# Patient Record
Sex: FEMALE | Race: WHITE | Employment: UNEMPLOYED | URBAN - NONMETROPOLITAN AREA
[De-identification: names, ages, dates, MRNs, and addresses within clinical notes are randomized per-mention and may not be internally consistent; named-entity substitution may affect disease eponyms.]

---
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
## 2022-01-04 NOTE — PROGRESS NOTES
Gynecologic History  Menarche at age 16    She delivered her first child at age 27, and did not breastfeed, used pump  Premenopausal  No hysterectomy  Oral contraceptive use: yes for 1 years and is not currently taking  Hormone use: no and is not currently taking    Bra Size: 34 B      Review of Systems   Constitutional: Positive for unexpected weight change (Trouble gaing weight). Eyes: Negative for visual disturbance. Respiratory: Negative for cough and shortness of breath. Cardiovascular: Negative for chest pain. Gastrointestinal: Positive for abdominal pain (Lower abd cramping). Musculoskeletal: Negative for arthralgias and myalgias. Neurological: Negative for headaches. Hematological: Negative for adenopathy. Does not bruise/bleed easily. Psychiatric/Behavioral: Positive for dysphoric mood (HX of depression). The patient is nervous/anxious.

## 2022-01-06 ENCOUNTER — OFFICE VISIT (OUTPATIENT)
Dept: SURGERY | Age: 22
End: 2022-01-06
Payer: COMMERCIAL

## 2022-01-06 VITALS
DIASTOLIC BLOOD PRESSURE: 63 MMHG | HEART RATE: 89 BPM | BODY MASS INDEX: 19.46 KG/M2 | SYSTOLIC BLOOD PRESSURE: 120 MMHG | TEMPERATURE: 98.7 F | RESPIRATION RATE: 16 BRPM | OXYGEN SATURATION: 98 % | HEIGHT: 64 IN | WEIGHT: 114 LBS

## 2022-01-06 DIAGNOSIS — N63.0 BREAST MASS: Primary | ICD-10-CM

## 2022-01-06 PROCEDURE — G8420 CALC BMI NORM PARAMETERS: HCPCS | Performed by: SURGERY

## 2022-01-06 PROCEDURE — 4004F PT TOBACCO SCREEN RCVD TLK: CPT | Performed by: SURGERY

## 2022-01-06 PROCEDURE — G8484 FLU IMMUNIZE NO ADMIN: HCPCS | Performed by: SURGERY

## 2022-01-06 PROCEDURE — G8427 DOCREV CUR MEDS BY ELIG CLIN: HCPCS | Performed by: SURGERY

## 2022-01-06 PROCEDURE — 99204 OFFICE O/P NEW MOD 45 MIN: CPT | Performed by: SURGERY

## 2022-01-06 ASSESSMENT — ENCOUNTER SYMPTOMS
ABDOMINAL PAIN: 1
COUGH: 0
SHORTNESS OF BREATH: 0

## 2022-01-06 NOTE — PROGRESS NOTES
22    CHIEF COMPLAINT: left breast mass    HISTORY OF PRESENT ILLNESS:  Jay Rudolph is a 23 y.o. woman who requested that I evaluate her for her left breast mass. The patient states that she palpated a mass in her left breast about 1 year ago. She underwent a left breast ultrasound in 2021 which demonstrated a probably benign mass for which she was recommended to undergo short interval follow-up imaging. She had this follow-up imaging last week which demonstrated a slight increase in size of the mass. She thinks it feels like the mass has increased slightly in size based on her own examination. She does experience occasional short shooting pains a few times a week. She presents today for a second opinion regarding this. The patient states that she herself had not noticed any other abnormal masses in either breast.  She denies any change in the appearance of her breasts or the skin of her breasts. She denies bilateral nipple discharge. She has no other systemic complaints and otherwise feels well today. She does have bilateral nipple piercings and states that she has been thinking of taking them out as the edges are not healing well. Pertinent Family History: No family history of breast or ovarian cancer. GYNECOLOGIC HISTORY:  Menarche at age 16    She delivered her first child at age 27, and did not breastfeed (used pump for 1 month)  Premenopausal  No hysterectomy  Oral contraceptive use: yes for 1 years and is not currently taking  Hormone use: no and is not currently taking    Past Medical History:   Diagnosis Date    Anemia      Past Surgical History:   Procedure Laterality Date    APPENDECTOMY       No current outpatient medications on file. No current facility-administered medications for this visit.      No Known Allergies  Social History     Socioeconomic History    Marital status: Single     Spouse name: Not on file    Number of children: Not on file    Years of education: Not on file    Highest education level: Not on file   Occupational History    Not on file   Tobacco Use    Smoking status: Current Every Day Smoker     Packs/day: 0.50     Years: 1.00     Pack years: 0.50     Types: Cigarettes    Smokeless tobacco: Never Used   Vaping Use    Vaping Use: Never used   Substance and Sexual Activity    Alcohol use: No    Drug use: No    Sexual activity: Yes     Partners: Male   Other Topics Concern    Not on file   Social History Narrative    Not on file     Social Determinants of Health     Financial Resource Strain:     Difficulty of Paying Living Expenses: Not on file   Food Insecurity:     Worried About Running Out of Food in the Last Year: Not on file    Golden of Food in the Last Year: Not on file   Transportation Needs:     Lack of Transportation (Medical): Not on file    Lack of Transportation (Non-Medical): Not on file   Physical Activity:     Days of Exercise per Week: Not on file    Minutes of Exercise per Session: Not on file   Stress:     Feeling of Stress : Not on file   Social Connections:     Frequency of Communication with Friends and Family: Not on file    Frequency of Social Gatherings with Friends and Family: Not on file    Attends Gnosticism Services: Not on file    Active Member of [address] [place] [address] [employer] or Organizations: Not on file    Attends Club or Organization Meetings: Not on file    Marital Status: Not on file   Intimate Partner Violence:     Fear of Current or Ex-Partner: Not on file    Emotionally Abused: Not on file    Physically Abused: Not on file    Sexually Abused: Not on file   Housing Stability:     Unable to Pay for Housing in the Last Year: Not on file    Number of [place] in the Last Year: Not on file    Unstable Housing in the Last Year: Not on file     History reviewed. No pertinent family history. REVIEW OF SYSTEMS:   Constitutional: Positive for unexpected weight change (Trouble gaing weight).    Eyes: Negative for visual disturbance. Respiratory: Negative for cough and shortness of breath. Cardiovascular: Negative for chest pain. Gastrointestinal: Positive for abdominal pain (Lower abd cramping). Musculoskeletal: Negative for arthralgias and myalgias. Neurological: Negative for headaches. Hematological: Negative for adenopathy. Does not bruise/bleed easily. Psychiatric/Behavioral: Positive for dysphoric mood (HX of depression). The patient is nervous/anxious. I personally reviewed and agree with the above ROS as documented by my medical assistant. PHYSICAL EXAMINATION:   Vitals: /63 (Site: Right Upper Arm, Position: Sitting, Cuff Size: Medium Adult)   Pulse 89   Temp 98.7 °F (37.1 °C) (Infrared)   Resp 16   Ht 5' 4\" (1.626 m)   Wt 114 lb (51.7 kg)   SpO2 98%   BMI 19.57 kg/m²   General: Well-developed, well-nourished, in no apparent distress. Eyes:  Conjunctivae appear normal. The sclerae are not injected and show no jaundice. Nose, Mouth and Throat:  Patient is wearing a mask. Neck: Supple, without thyromegaly or adenopathy. Respiratory: Normal respiratory effort. Cardiovascular: Regular rate and rhythm. No lower extremity edema. Musculoskeletal: Normal gait and range of motion in all 4 extremities. Psychiatric: Alert and oriented x 3. Appropriate affect and behavior for today's visit. Skin: No visible concerning rashes, lesions, nodules or other skin changes. Lymphatic System:  No concerning cervical, supraclavicular or axillary lymphadenopathy. Breast Exam:  The breasts are normal in contour. Bra size 34B. Bilateral nipple piercings. Dense fibroglandular tissue upper outer quadrants which is symmetric bilaterally. Right Breast: Examination of the right breast in the upright and supine positions reveal no obvious masses, skin changes, dimpling, or retraction. The nipple and areola are without erosion, edema or ulceration. There is no obvious nipple discharge. There is no concerning axillary adenopathy. Left Breast: Examination of the left breast in the upright and supine positions reveals a 2.5 cm round mobile mass at the medial areolar boarder. There are no other obvious masses, skin changes, dimpling, or retraction. The nipple and areola are without erosion, edema or ulceration. There is no obvious nipple discharge. There is no concerning axillary adenopathy. IMAGING: I personally reviewed the breast imaging performed from February and December 2021 and discussed this with the patient. There is a circumscribed hypoechoic solid mass in the left breast at 10:00 1 CFN. This has undergone slight interval increase in size now measuring 2.3 x 2.3 x 1.3 cm (from 2 x 1.9 x 1.1 cm). She was given a BI-RADS 3. IMPRESSION/RECOMMENDATION:  Britni Shukla is a 23 y.o. woman who presents today for a consultation regarding her left breast mass. 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 We reviewed that simple fibroadenomas do not significantly raise the risk of malignancy. We discussed that fibroadenomas may be observed and followed with an ultrasound every 6 months to ensure stability. Indications for surgical excision include a large size greater than 2-3 cm, rapid growth pattern, symptoms which typically included pain, core biopsies with pathology identifying a fibroepithelial lesion, cellular fibroadenoma or phyllodes tumor. The patient prefers to have this mass excised given that it has increased in size slightly and she is concerned about continued growth. She also has occasional intermittent pains from this. I do think excision is reasonable given the size and growth on imaging.   We discussed that we usually perform a core needle biopsy prior to [address], but given that there is a low probability of malignancy and that we will excise this regardless, I think it is reasonable to proceed straight to the OR. We will thus schedule a left excisional breast biopsy. I described the procedure and explained the risks, benefits, and alternatives of this procedure, which include, but are not limited to: anesthetic risk, bleeding, infection, wound complications, sensation changes, unappealing cosmetics, the need to return to the operating room for a second procedure based on final pathology. She understands and wishes to proceed. I will make arrangements to schedule this in the operating room as soon as possible. I answered all of her questions thoroughly, and she does seem pleased with this plan of approach. I encouraged her to contact me in the interim if any new questions or concerns arise.     Summary:  - left palpation guided excisional breast biopsy    Casandra Duval MD

## 2022-01-11 ENCOUNTER — TELEPHONE (OUTPATIENT)
Dept: SURGERY | Age: 22
End: 2022-01-11

## 2022-01-11 NOTE — TELEPHONE ENCOUNTER
Spoke with Dr. Silverio Toney to establish surgery date and time. Called this patient was driving home from having pre-op H&P done. Patient unable to write down information at this time. This writer sent this patient a "[employer] message containing date and time of surgery. Advised patient to call this office with any questions. Surgery date 02/08/22, arrival time of 0730, surgery start time of 0930.

## 2022-01-11 NOTE — TELEPHONE ENCOUNTER
Pt called to ask about her surgery date and time. Pt stated no one has called her to schedule her surgery. She is trying to coordinate with her boyfriend about  dates and times, because he will have to take off work. Pt can be reached back at [phone]. Pt stated please leave a VM if she does not answer as she has her pre op H&P today.

## 2022-01-13 NOTE — PROGRESS NOTES
Twilla Smoke    Age 23 y.o.    female    2000    MRN [id number]    2/8/2022  Arrival Time_____________  OR Time____________[address]     Procedure(s):  LEFT PALPATION GUIDED EXCISIONAL BREAST BIOPSY                      General     Surgeon(s):  Nain Chapman, MD      DAY ADMIT ___  SDS/OP ___  OUTPT IN BED ___         Phone [phone] (home)    PCP _____________________ Phone_________________ Epic ( ) Epic CE ( ) Appt ________    ADDITIONAL INFO __________________________________ Cardio/Consult _____________    NOTES _____________________________________________________________________    ____________________________________________________________________________    PAT APPT DATE:________ TIME: ________  FAXED QAD: _______  (__) H&P w/ hospitalist  ____________________________________________________________________________    COVID TEST: Date/Location______________        NURSING HISTORY COMPLETE: _______  (__) CBC       (__) W/ DIFF ___________  (__)  ECHO    __________  (__) Hgb A1C    ___________  (__) CHEST X RAY   __________  (__) LIPID PROFILE  ___________  (__) EKG   __________  (__) PT/PTT   ___________  (__) PFT's   __________  (__) BMP   ___________  (__) CAROTIDS  __________  (__) CMP   ___________  (__) VEIN MAPPING  __________  (__) U/A   ___________  (__) HISTORY & PHYSICAL __________  (__) URINE C & S  ___________  (__) CARDIAC CLEARANCE __________  (__) U/A W/ FLEX  ___________  (__) PULM.  CLEARANCE __________  (__) SERUM PREGNANCY ___________  (__) Check Epic DOS orders __________  (__) TYPE & SCREEN ________ repeat ( ) (__)  __________________ __________  (__) ALBUMIN   ___________  (__)  __________________ __________  (__) TRANSFERRIN  ___________  (__)  __________________ __________  (__) LIVER PROFILE  ___________  (__)  __________________ __________  (__) CARBOXY HGB  ___________  (__) URINE PREG DOS __________  (__) NICOTINE & MET.  ___________  (__) BLOOD SUGAR DOS __________  (__) PREALBUMIN  ___________    (__) MRSA NASAL SWAB ___________  (__) BLOOD THINNERS __________  (__) ACE/ ARBS: _____________________    (__) BETABLOCKERS ___________________

## 2022-02-01 NOTE — PROGRESS NOTES
1. Do not eat or drink anything after 12 midnight prior to surgery. This includes no water, chewing gum mints, or ice chips. You may brush your teeth and gargle the day of surgery but DO NOT SWALLOW THE WATER. 2. Please see your family doctor/pediatrician for a history and physical and/or concerning medications. Bring any test results/reports from your physician's office. If you are under the care of a heart doctor or specialist please be aware that you may be asked to see him or her for clearance. 3. You may be asked to stop blood thinners such as Coumadin, Plavix, Fragmin, and Lovenox or Anti-inflammatories such as Aspirin, Ibuprofen, Advil, and Naproxen prior to your surgery. Please check with your doctor before stopping these or any other medications. 4. Do not smoke, and do not drink any alcoholic beverages 24 hours prior to surgery. 5. You MUST make arrangements for a responsible adult to take you home after your surgery. For your safety, you will not be allowed to leave alone or drive yourself home. Your surgery will be cancelled if you do not have a ride home. Also for your safety, it is strongly suggested someone stay with you the first 24 hrs after your surgery. 6. A parent/legal guardian must accompany a child scheduled for surgery and plan to stay at the hospital until the child is discharged. Please do not bring other children with you. 7. For your comfort,please wear simple, loose fitting clothing to the hospital.  Please do not bring valuables (money, credit cards, checkbooks, etc.) Do not wear any makeup (including no eye makeup) or nail polish on your fingers or toes. 8. For your safety, please DO NOT wear any jewelry or piercings on day of surgery. All body piercing jewelry must be removed. 9. If you have dentures, they will be removed before going to the OR; for your convenience we will provide you with a container.   If you wear contact lenses or glasses, they will be removed, they will be removed, please bring a case for them. 10. If appicable,Please see your family doctor/pediatrician for a history & physical and/or concerning medications. Bring any test results/reports from your physician's office. 11. Remember to bring Blood Bank bracelet to the hospital on the day of surgery. 12. If you have a Living Will and Durable Power of  for Healthcare, please bring in a copy. 15. Notify your Surgeon if you develop any illness between now and surgery  time, cough, cold, fever, sore throat, nausea, vomiting, etc.  Please notify your surgeon if you experience dizziness, shortness of breath or blurred vision between now & the time of your surgery   14. DO NOT shave your operative site 96 hours prior to surgery. For face & neck surgery, men may use an electric razor 48 hours prior to surgery. 15. Shower the night before surgery with _X__Antibacterial soap ___Hibiclens   16. To provide excellent care visitors will be limited to one in the room at any given time. 17.  Please bring picture ID and insurance card. 18.  Visit our web site for additional information:  [facility] [employer]/surgery.

## 2022-02-01 NOTE — PROGRESS NOTES
Preoperative Screening for Elective Surgery/Invasive Procedures While COVID-19 present in the community     Have you had any of the following symptoms? NONE  o Fever, chills  o Cough  o Shortness of breath  o Muscle aches/pain  o Diarrhea  o Abdominal pain, nausea, vomiting  o Loss or decrease in taste and / or smell   Risk of Exposure  o Have you recently been hospitalized for COVID-19 or flu-like illness, if so when? NO  o Recently diagnosed with COVID-19, if so when? NO  o Recently tested for COVID-19, if so when? NO - COVID TEST 2/2/22 FOR PREOP  o Have you been in close contact with a person or family member who currently has or recently had COVID-23? If yes, when and in what context? NO  o Do you live with anybody who in the last 14 days has had fever, chills, shortness of breath, muscle aches, flu-like illness? NO  o Do you have any close contacts or family members who are currently in the hospital for COVID-19 or flu-like illness? If yes, assess recent close contact with this person. NO    Indicate if the patient has a positive screen by answering yes to one or more of the above questions. Patients who test positive or screen positive prior to surgery or on the day of surgery should be evaluated in conjunction with the surgeon/proceduralist/anesthesiologist to determine the urgency of the procedure.

## 2022-02-02 ENCOUNTER — HOSPITAL ENCOUNTER (OUTPATIENT)
Age: 22
Discharge: HOME | End: 2022-02-02
Payer: COMMERCIAL

## 2022-02-02 PROCEDURE — U0003 INFECTIOUS AGENT DETECTION BY NUCLEIC ACID (DNA OR RNA); SEVERE ACUTE RESPIRATORY SYNDROME CORONAVIRUS 2 (SARS-COV-2) (CORONAVIRUS DISEASE [COVID-19]), AMPLIFIED PROBE TECHNIQUE, MAKING USE OF HIGH THROUGHPUT TECHNOLOGIES AS DESCRIBED BY CMS-2020-01-R: HCPCS

## 2022-02-02 PROCEDURE — U0005 INFEC AGEN DETEC AMPLI PROBE: HCPCS

## 2022-02-03 LAB — SARS-COV-2: NOT DETECTED

## 2022-02-04 ENCOUNTER — ANESTHESIA EVENT (OUTPATIENT)
Dept: OPERATING ROOM | Age: 22
End: 2022-02-04
Payer: COMMERCIAL

## 2022-02-08 ENCOUNTER — ANESTHESIA (OUTPATIENT)
Dept: OPERATING ROOM | Age: 22
End: 2022-02-08
Payer: COMMERCIAL

## 2022-02-08 ENCOUNTER — HOSPITAL ENCOUNTER (OUTPATIENT)
Age: 22
Setting detail: OUTPATIENT SURGERY
Discharge: HOME | End: 2022-02-08
Attending: SURGERY | Admitting: SURGERY
Payer: COMMERCIAL

## 2022-02-08 VITALS
HEIGHT: 64 IN | BODY MASS INDEX: 18.95 KG/M2 | HEART RATE: 69 BPM | WEIGHT: 111 LBS | RESPIRATION RATE: 15 BRPM | OXYGEN SATURATION: 97 % | SYSTOLIC BLOOD PRESSURE: 101 MMHG | TEMPERATURE: 96.9 F | DIASTOLIC BLOOD PRESSURE: 56 MMHG

## 2022-02-08 VITALS
DIASTOLIC BLOOD PRESSURE: 41 MMHG | SYSTOLIC BLOOD PRESSURE: 83 MMHG | RESPIRATION RATE: 20 BRPM | OXYGEN SATURATION: 100 %

## 2022-02-08 DIAGNOSIS — N63.0 MASS OF BREAST: ICD-10-CM

## 2022-02-08 LAB
HCT VFR BLD CALC: 34.7 % (ref 36–48)
HEMOGLOBIN: 11.5 G/DL (ref 12–16)
MCH RBC QN AUTO: 29.5 PG (ref 26–34)
MCHC RBC AUTO-ENTMCNC: 33 G/DL (ref 31–36)
MCV RBC AUTO: 89.2 FL (ref 80–100)
PDW BLD-RTO: 13.9 % (ref 12.4–15.4)
PLATELET # BLD: 199 K/UL (ref 135–450)
PMV BLD AUTO: 8.6 FL (ref 5–10.5)
PREGNANCY, URINE: NEGATIVE
RBC # BLD: 3.89 M/UL (ref 4–5.2)
WBC # BLD: 3.3 K/UL (ref 4–11)

## 2022-02-08 PROCEDURE — 2500000003 HC RX 250 WO HCPCS: Performed by: SURGERY

## 2022-02-08 PROCEDURE — 85027 COMPLETE CBC AUTOMATED: CPT

## 2022-02-08 PROCEDURE — 2580000003 HC RX 258: Performed by: ANESTHESIOLOGY

## 2022-02-08 PROCEDURE — 2709999900 HC NON-CHARGEABLE SUPPLY: Performed by: SURGERY

## 2022-02-08 PROCEDURE — 7100000000 HC PACU RECOVERY - FIRST 15 MIN: Performed by: SURGERY

## 2022-02-08 PROCEDURE — 3700000001 HC ADD 15 MINUTES (ANESTHESIA): Performed by: SURGERY

## 2022-02-08 PROCEDURE — 7100000011 HC PHASE II RECOVERY - ADDTL 15 MIN: Performed by: SURGERY

## 2022-02-08 PROCEDURE — 6360000002 HC RX W HCPCS: Performed by: ANESTHESIOLOGY

## 2022-02-08 PROCEDURE — 3600000003 HC SURGERY LEVEL 3 BASE: Performed by: SURGERY

## 2022-02-08 PROCEDURE — 7100000001 HC PACU RECOVERY - ADDTL 15 MIN: Performed by: SURGERY

## 2022-02-08 PROCEDURE — 2500000003 HC RX 250 WO HCPCS: Performed by: NURSE ANESTHETIST, CERTIFIED REGISTERED

## 2022-02-08 PROCEDURE — 84703 CHORIONIC GONADOTROPIN ASSAY: CPT

## 2022-02-08 PROCEDURE — 88307 TISSUE EXAM BY PATHOLOGIST: CPT

## 2022-02-08 PROCEDURE — 3700000000 HC ANESTHESIA ATTENDED CARE: Performed by: SURGERY

## 2022-02-08 PROCEDURE — 3600000013 HC SURGERY LEVEL 3 ADDTL 15MIN: Performed by: SURGERY

## 2022-02-08 PROCEDURE — 6360000002 HC RX W HCPCS: Performed by: NURSE ANESTHETIST, CERTIFIED REGISTERED

## 2022-02-08 PROCEDURE — 6360000002 HC RX W HCPCS: Performed by: SURGERY

## 2022-02-08 PROCEDURE — 2500000003 HC RX 250 WO HCPCS: Performed by: ANESTHESIOLOGY

## 2022-02-08 PROCEDURE — 7100000010 HC PHASE II RECOVERY - FIRST 15 MIN: Performed by: SURGERY

## 2022-02-08 RX ORDER — DEXAMETHASONE SODIUM PHOSPHATE 4 MG/ML
INJECTION, SOLUTION INTRA-ARTICULAR; INTRALESIONAL; INTRAMUSCULAR; INTRAVENOUS; SOFT TISSUE PRN
Status: DISCONTINUED | OUTPATIENT
Start: 2022-02-08 | End: 2022-02-08 | Stop reason: SDUPTHER

## 2022-02-08 RX ORDER — ONDANSETRON 2 MG/ML
INJECTION INTRAMUSCULAR; INTRAVENOUS PRN
Status: DISCONTINUED | OUTPATIENT
Start: 2022-02-08 | End: 2022-02-08 | Stop reason: SDUPTHER

## 2022-02-08 RX ORDER — SODIUM CHLORIDE 9 MG/ML
25 INJECTION, SOLUTION INTRAVENOUS PRN
Status: DISCONTINUED | OUTPATIENT
Start: 2022-02-08 | End: 2022-02-08 | Stop reason: HOSPADM

## 2022-02-08 RX ORDER — OXYCODONE HYDROCHLORIDE AND ACETAMINOPHEN 5; 325 MG/1; MG/1
1 TABLET ORAL PRN
Status: DISCONTINUED | OUTPATIENT
Start: 2022-02-08 | End: 2022-02-08 | Stop reason: HOSPADM

## 2022-02-08 RX ORDER — MEPERIDINE HYDROCHLORIDE 50 MG/ML
12.5 INJECTION INTRAMUSCULAR; INTRAVENOUS; SUBCUTANEOUS EVERY 5 MIN PRN
Status: DISCONTINUED | OUTPATIENT
Start: 2022-02-08 | End: 2022-02-08 | Stop reason: HOSPADM

## 2022-02-08 RX ORDER — PHENYLEPHRINE HCL IN 0.9% NACL 1 MG/10 ML
SYRINGE (ML) INTRAVENOUS PRN
Status: DISCONTINUED | OUTPATIENT
Start: 2022-02-08 | End: 2022-02-08 | Stop reason: SDUPTHER

## 2022-02-08 RX ORDER — MORPHINE SULFATE 2 MG/ML
1 INJECTION, SOLUTION INTRAMUSCULAR; INTRAVENOUS EVERY 5 MIN PRN
Status: DISCONTINUED | OUTPATIENT
Start: 2022-02-08 | End: 2022-02-08 | Stop reason: HOSPADM

## 2022-02-08 RX ORDER — SODIUM CHLORIDE, SODIUM LACTATE, POTASSIUM CHLORIDE, CALCIUM CHLORIDE 600; 310; 30; 20 MG/100ML; MG/100ML; MG/100ML; MG/100ML
INJECTION, SOLUTION INTRAVENOUS CONTINUOUS
Status: DISCONTINUED | OUTPATIENT
Start: 2022-02-08 | End: 2022-02-08 | Stop reason: HOSPADM

## 2022-02-08 RX ORDER — OXYCODONE HYDROCHLORIDE AND ACETAMINOPHEN 5; 325 MG/1; MG/1
2 TABLET ORAL PRN
Status: DISCONTINUED | OUTPATIENT
Start: 2022-02-08 | End: 2022-02-08 | Stop reason: HOSPADM

## 2022-02-08 RX ORDER — SODIUM CHLORIDE 0.9 % (FLUSH) 0.9 %
10 SYRINGE (ML) INJECTION EVERY 12 HOURS SCHEDULED
Status: DISCONTINUED | OUTPATIENT
Start: 2022-02-08 | End: 2022-02-08 | Stop reason: HOSPADM

## 2022-02-08 RX ORDER — MORPHINE SULFATE 2 MG/ML
2 INJECTION, SOLUTION INTRAMUSCULAR; INTRAVENOUS EVERY 5 MIN PRN
Status: DISCONTINUED | OUTPATIENT
Start: 2022-02-08 | End: 2022-02-08 | Stop reason: HOSPADM

## 2022-02-08 RX ORDER — SODIUM CHLORIDE 0.9 % (FLUSH) 0.9 %
10 SYRINGE (ML) INJECTION PRN
Status: DISCONTINUED | OUTPATIENT
Start: 2022-02-08 | End: 2022-02-08 | Stop reason: HOSPADM

## 2022-02-08 RX ORDER — FENTANYL CITRATE 50 UG/ML
INJECTION, SOLUTION INTRAMUSCULAR; INTRAVENOUS PRN
Status: DISCONTINUED | OUTPATIENT
Start: 2022-02-08 | End: 2022-02-08 | Stop reason: SDUPTHER

## 2022-02-08 RX ORDER — PROPOFOL 10 MG/ML
INJECTION, EMULSION INTRAVENOUS PRN
Status: DISCONTINUED | OUTPATIENT
Start: 2022-02-08 | End: 2022-02-08 | Stop reason: SDUPTHER

## 2022-02-08 RX ORDER — ONDANSETRON 2 MG/ML
4 INJECTION INTRAMUSCULAR; INTRAVENOUS
Status: DISCONTINUED | OUTPATIENT
Start: 2022-02-08 | End: 2022-02-08 | Stop reason: HOSPADM

## 2022-02-08 RX ORDER — BUPIVACAINE HYDROCHLORIDE 5 MG/ML
INJECTION, SOLUTION EPIDURAL; INTRACAUDAL PRN
Status: DISCONTINUED | OUTPATIENT
Start: 2022-02-08 | End: 2022-02-08 | Stop reason: ALTCHOICE

## 2022-02-08 RX ORDER — MIDAZOLAM HYDROCHLORIDE 1 MG/ML
INJECTION INTRAMUSCULAR; INTRAVENOUS PRN
Status: DISCONTINUED | OUTPATIENT
Start: 2022-02-08 | End: 2022-02-08 | Stop reason: SDUPTHER

## 2022-02-08 RX ORDER — LIDOCAINE HYDROCHLORIDE 20 MG/ML
INJECTION, SOLUTION INFILTRATION; PERINEURAL PRN
Status: DISCONTINUED | OUTPATIENT
Start: 2022-02-08 | End: 2022-02-08 | Stop reason: SDUPTHER

## 2022-02-08 RX ADMIN — MIDAZOLAM HYDROCHLORIDE 2 MG: 2 INJECTION, SOLUTION INTRAMUSCULAR; INTRAVENOUS at 08:58

## 2022-02-08 RX ADMIN — FENTANYL CITRATE 25 MCG: 50 INJECTION INTRAMUSCULAR; INTRAVENOUS at 09:18

## 2022-02-08 RX ADMIN — CEFAZOLIN 2000 MG: 10 INJECTION, POWDER, FOR SOLUTION INTRAVENOUS at 09:11

## 2022-02-08 RX ADMIN — ONDANSETRON 4 MG: 2 INJECTION INTRAMUSCULAR; INTRAVENOUS at 09:50

## 2022-02-08 RX ADMIN — FAMOTIDINE 20 MG: 10 INJECTION, SOLUTION INTRAVENOUS at 08:11

## 2022-02-08 RX ADMIN — FENTANYL CITRATE 50 MCG: 50 INJECTION INTRAMUSCULAR; INTRAVENOUS at 09:07

## 2022-02-08 RX ADMIN — HYDROMORPHONE HYDROCHLORIDE 0.5 MG: 1 INJECTION, SOLUTION INTRAMUSCULAR; INTRAVENOUS; SUBCUTANEOUS at 11:02

## 2022-02-08 RX ADMIN — Medication 50 MCG: at 09:49

## 2022-02-08 RX ADMIN — FENTANYL CITRATE 25 MCG: 50 INJECTION INTRAMUSCULAR; INTRAVENOUS at 09:12

## 2022-02-08 RX ADMIN — LIDOCAINE HYDROCHLORIDE 60 MG: 20 INJECTION, SOLUTION INFILTRATION; PERINEURAL at 09:07

## 2022-02-08 RX ADMIN — DEXAMETHASONE SODIUM PHOSPHATE 8 MG: 4 INJECTION, SOLUTION INTRAMUSCULAR; INTRAVENOUS at 09:12

## 2022-02-08 RX ADMIN — SODIUM CHLORIDE, POTASSIUM CHLORIDE, SODIUM LACTATE AND CALCIUM CHLORIDE: 600; 310; 30; 20 INJECTION, SOLUTION INTRAVENOUS at 08:12

## 2022-02-08 RX ADMIN — PROPOFOL 200 MG: 10 INJECTION, EMULSION INTRAVENOUS at 09:07

## 2022-02-08 ASSESSMENT — PULMONARY FUNCTION TESTS
PIF_VALUE: 2
PIF_VALUE: 4
PIF_VALUE: 2
PIF_VALUE: 2
PIF_VALUE: 5
PIF_VALUE: 2
PIF_VALUE: 2
PIF_VALUE: 4
PIF_VALUE: 1
PIF_VALUE: 2
PIF_VALUE: 1
PIF_VALUE: 5
PIF_VALUE: 2
PIF_VALUE: 10
PIF_VALUE: 4
PIF_VALUE: 2
PIF_VALUE: 2
PIF_VALUE: 15
PIF_VALUE: 6
PIF_VALUE: 4
PIF_VALUE: 2
PIF_VALUE: 1
PIF_VALUE: 2
PIF_VALUE: 4
PIF_VALUE: 0
PIF_VALUE: 2
PIF_VALUE: 3
PIF_VALUE: 1
PIF_VALUE: 2
PIF_VALUE: 1
PIF_VALUE: 2
PIF_VALUE: 0
PIF_VALUE: 4
PIF_VALUE: 2

## 2022-02-08 ASSESSMENT — PAIN - FUNCTIONAL ASSESSMENT: PAIN_FUNCTIONAL_ASSESSMENT: 0-10

## 2022-02-08 ASSESSMENT — LIFESTYLE VARIABLES: SMOKING_STATUS: 1

## 2022-02-08 ASSESSMENT — PAIN SCALES - GENERAL: PAINLEVEL_OUTOF10: 7

## 2022-02-08 ASSESSMENT — ENCOUNTER SYMPTOMS: SHORTNESS OF BREATH: 0

## 2022-02-08 NOTE — OP NOTE
Operative Note    Kristine Rueda  YOB: 2000  MRN: [id number]    PREOPERATIVE DIAGNOSIS  left breast mass     POSTOPERATIVE DIAGNOSIS  left breast mass    PROCEDURE  1. left excisional breast biopsy     ANESTHESIA  General anesthesia    SURGEON  Karen Ayala MD     ASSISTANT  Mely Michael    ESTIMATED BLOOD LOSS  less than 50 ml    COMPLICATIONS  None apparent by completion of procedure    SPECIMENS REMOVED  1. left breast tissue which was marked with a short stitch on the superior margin and a long stitch on the lateral margin. FINDINGS  The left breast tissue was completely excised using palpation. POST-OP CONDITION  Stable    DISPOSITION  To recovery room    INDICATION FOR PROCEDURE  Kristine Rueda is a 23 y.o. woman who was found to have an abnormality in her left breast on imaging. It began increasing in size and she thus elected to have it removed. She presents today for that purpose. The indications for the planned procedure, along with the potential benefits and risks which include but are not limited to: anesthetic risk, bleeding, infection, wound complications, sensation changes, unappealing cosmetics, and the need to return to the operating room for a second procedure based on final pathology were reviewed. All questions were answered, and she agrees to proceed. DESCRIPTION OF PROCEDURE   Kristine Rueda was brought to the operating room and placed supine on the operating room table with her arms extended on boards. She was appropriately positioned and padded. Bilateral sequential compression devices were applied to both lower extremities and a single dose of antibiotics was administered intravenously within 60 minutes prior to the incision time. Breast imaging was available in the room. After induction of anesthesia, a timeout procedure was performed. The patient was prepped and draped in the standard surgical fashion.   We directed our attention to the left breast.  The mass was identified using palpation in the central breast.  A periareolar incision was planned anterior to the mass. The incision was made and carried down through the dermis with electrocautery. No skin was removed. An anterior flap was created beyond the mass and then tissue was excised with grossly clear margins anterior, superior, inferior, medial, and lateral to it. The tissue was then transected from the posterior attachments. Dissection was not carried to the chest wall. The specimen was then marked with a short stitch on the superior margin and a long stitch on the lateral margin. No additional margins were obtained. Attention was then turned to the wound. Aggressive hemostasis was obtained with electrocautery. The wound was irrigated with copious amounts of sterile saline. 0.5% bupivacaine was infiltrated into the soft tissues surrounding the cavity and hemostasis was again confirmed. The deep dermal layer was then reapproximated with interrupted 3-0 Vicryl stitches. The skin was reapproximated with a running subcuticular using 4-0 Monocryl. Surgical glue dressing was applied. The patient tolerated this procedure well, was awakened and transferred to the recovery room. The instrument, sponge, and needle counts were correct. Her family was notified of intraoperative findings.     Electronically signed by Cherylene Feather, MD on 2/8/22 at 9:53 AM EST

## 2022-02-08 NOTE — H&P
H&P Update    The patient's most recent H&P, office notes, breast imaging, and pathology were reviewed. Patient examined and laterality marked. There has been no changes. We will plan to proceed with a left excisional breast biopsy.     Sherice Siddiqui MD

## 2022-02-08 NOTE — PROGRESS NOTES
Patient arrived in PACU at this time and placed on monitor. Report received from [address] [place] [address] [place] and Vinnie OVIEDO. Will continue to monitor.     Patient has oral airway in place with

## 2022-02-08 NOTE — ANESTHESIA POSTPROCEDURE EVALUATION
Department of Anesthesiology  Postprocedure Note    Patient: Fabiana Dacosta  MRN: [id number]  YOB: 2000  Date of evaluation: 2/8/2022  Time:  11:16 AM     Procedure Summary     Date: 02/08/22 Room / Location: [facility] OR [address] [place] [address]    Anesthesia Start: 1487 Anesthesia Stop: 1004    Procedure: LEFT PALPATION GUIDED EXCISIONAL BREAST BIOPSY (Left Breast) Diagnosis:       Mass of breast      (LEFT BREAST MASS)    Surgeons: Glenys Ontiveros MD Responsible Provider: Juno Camarillo MD    Anesthesia Type: general ASA Status: 2          Anesthesia Type: general    Mera Phase I: Mera Score: 10    Mera Phase II:      Last vitals: Reviewed and per EMR flowsheets.    Vitals:    02/08/22 0806 02/08/22 1005 02/08/22 1010 02/08/22 1015   BP: 123/79 (!) 88/31  (!) 91/39   Pulse: 93 67 67 66   Resp: 16 18 17 24   Temp: 98.1 °F (36.7 °C) 96.1 °F (35.6 °C)     TempSrc: Temporal Temporal     SpO2: 98% 99% 98% 98%   Weight:       Height:           Anesthesia Post Evaluation    Patient location during evaluation: bedside  Patient participation: complete - patient participated  Level of consciousness: awake and alert  Airway patency: patent  Nausea & Vomiting: no nausea  Complications: no  Cardiovascular status: hemodynamically stable  Respiratory status: acceptable  Hydration status: euvolemic

## 2022-02-08 NOTE — ANESTHESIA PRE PROCEDURE
Department of Anesthesiology  Preprocedure Note       Name:  Cathryn Culver   Age:  23 y.o.  :  2000                                          MRN:  [id number]         Date:  2022      Surgeon: Beulah Tejada):  Castro Shell MD    Procedure: Procedure(s):  LEFT PALPATION GUIDED EXCISIONAL BREAST BIOPSY    Medications prior to admission:   Prior to Admission medications    Not on File       Current medications:    Current Facility-Administered Medications   Medication Dose Route Frequency Provider Last Rate Last Admin    lactated ringers infusion   IntraVENous Continuous Bibi Kaur  mL/hr at 22 2367 New Bag at 22 3781    sodium chloride flush 0.9 % injection 10 mL  10 mL IntraVENous 2 times per day Bibi Kaur MD        sodium chloride flush 0.9 % injection 10 mL  10 mL IntraVENous PRN Bibi Kaur MD        0.9 % sodium chloride infusion  25 mL IntraVENous PRN Bibi Kaur MD        ceFAZolin (ANCEF) 2000 mg in dextrose 5 % 100 mL IVPB  2,000 mg IntraVENous On Call to 1709 Rafy Estrada MD           Allergies:  No Known Allergies    Problem List:    Patient Active Problem List   Diagnosis Code    Normal labor and delivery O80     (spontaneous vaginal delivery) O80       Past Medical History:        Diagnosis Date    Anemia     during pregnancy    Anesthesia     \"emotional after anesthesia\"    Left breast mass        Past Surgical History:        Procedure Laterality Date    APPENDECTOMY         Social History:    Social History     Tobacco Use    Smoking status: Current Every Day Smoker     Packs/day: 0.50     Years: 1.00     Pack years: 0.50     Types: Cigarettes    Smokeless tobacco: Never Used   Substance Use Topics    Alcohol use:  No                                Ready to quit: Not Answered  Counseling given: Yes      Vital Signs (Current):   Vitals:    22 0936 22 0806   BP:  123/79   Pulse: 93   Resp:  16   Temp:  98.1 °F (36.7 °C)   TempSrc:  Temporal   SpO2:  98%   Weight: 111 lb (50.3 kg)    Height: 5' 4\" (1.626 m)                                               BP Readings from Last 3 Encounters:   02/08/22 123/79   01/06/22 120/63   07/30/20 119/73       NPO Status: Time of last liquid consumption: 2100                        Time of last solid consumption: 2100                        Date of last liquid consumption: 02/07/22                        Date of last solid food consumption: 02/07/22    BMI:   Wt Readings from Last 3 Encounters:   02/01/22 111 lb (50.3 kg)   01/06/22 114 lb (51.7 kg)   07/27/20 141 lb (64 kg) (75 %, Z= 0.66)*     * Growth percentiles are based on CDC (Girls, 2-20 Years) data. Body mass index is 19.05 kg/m². CBC:   Lab Results   Component Value Date    WBC 3.3 02/08/2022    RBC 3.89 02/08/2022    HGB 11.5 02/08/2022    HCT 34.7 02/08/2022    MCV 89.2 02/08/2022    RDW 13.9 02/08/2022     02/08/2022       CMP:   Lab Results   Component Value Date     12/13/2018    K 3.8 12/13/2018     12/13/2018    CO2 23 12/13/2018    BUN 9 12/13/2018    CREATININE <0.5 12/13/2018    GFRAA >60 12/13/2018    AGRATIO 1.3 12/13/2018    LABGLOM >60 12/13/2018    GLUCOSE 89 12/13/2018    PROT 8.2 12/13/2018    CALCIUM 9.5 12/13/2018    BILITOT 0.4 12/13/2018    ALKPHOS 74 12/13/2018    AST 13 12/13/2018    ALT 9 12/13/2018       POC Tests: No results for input(s): POCGLU, POCNA, POCK, POCCL, POCBUN, POCHEMO, POCHCT in the last 72 hours.     Coags: No results found for: PROTIME, INR, APTT    HCG (If Applicable):   Lab Results   Component Value Date    PREGTESTUR Negative 02/08/2022        ABGs: No results found for: PHART, PO2ART, KPF3MNP, HXN4ZRZ, BEART, I0HICJDV     Type & Screen (If Applicable):  No results found for: LABABO, LABRH    Drug/Infectious Status (If Applicable):  No results found for: HIV, HEPCAB    COVID-19 Screening (If Applicable):   Lab Results Component Value Date    COVID19 Not Detected 02/02/2022           Anesthesia Evaluation  Patient summary reviewed no history of anesthetic complications:   Airway: Mallampati: II  TM distance: >3 FB   Neck ROM: full  Mouth opening: > = 3 FB Dental:          Pulmonary: breath sounds clear to auscultation  (+) current smoker (1 PPD)    (-) COPD, asthma, shortness of breath, recent URI and sleep apnea          Patient smoked on day of surgery. Cardiovascular:Negative CV ROS        (-) pacemaker, hypertension, past MI, CAD, CABG/stent, dysrhythmias,  angina and  CHF    ECG reviewed  Rhythm: regular  Rate: normal           Beta Blocker:  Not on Beta Blocker         Neuro/Psych:   Negative Neuro/Psych ROS     (-) seizures, TIA, CVA and psychiatric history           GI/Hepatic/Renal: Neg GI/Hepatic/Renal ROS       (-) GERD, liver disease, no renal disease, bowel prep and no morbid obesity       Endo/Other:    (+) blood dyscrasia (HX ANEMIA W/ PREG.)::., no malignancy/cancer. (-) diabetes mellitus, hypothyroidism, hyperthyroidism, arthritis, no malignancy/cancer               Abdominal:       Abdomen: soft. Vascular: negative vascular ROS. Other Findings:             Anesthesia Plan      general     ASA 2       Induction: intravenous. MIPS: Postoperative opioids intended and Prophylactic antiemetics administered. Anesthetic plan and risks discussed with spouse and patient. Plan discussed with CRNA. This pre-anesthesia assessment may be used as a history and physical.    DOS STAFF ADDENDUM:    Pt seen and examined, chart reviewed (including anesthesia, drug and allergy history). No interval changes to history and physical examination. Anesthetic plan, risks, benefits, alternatives, and personnel involved discussed with patient. Patient verbalized an understanding and agrees to proceed.       Sebastian Anderson MD  February 8, 2022  8:34 AM      Cierra Valencia Lashae Perea MD   2/8/2022

## 2022-04-21 ENCOUNTER — HOSPITAL ENCOUNTER (OUTPATIENT)
Dept: ULTRASOUND IMAGING | Age: 22
Discharge: HOME | End: 2022-04-21
Payer: COMMERCIAL

## 2022-04-21 DIAGNOSIS — R10.2 PELVIC PAIN IN FEMALE: ICD-10-CM

## 2022-04-21 PROCEDURE — 76856 US EXAM PELVIC COMPLETE: CPT

## 2022-07-07 ENCOUNTER — HOSPITAL ENCOUNTER (OUTPATIENT)
Dept: ULTRASOUND IMAGING | Age: 22
Discharge: HOME | End: 2022-07-07
Payer: COMMERCIAL

## 2022-07-07 DIAGNOSIS — N94.6 DYSMENORRHEA: ICD-10-CM

## 2022-07-07 PROCEDURE — 76830 TRANSVAGINAL US NON-OB: CPT

## 2022-07-07 PROCEDURE — 76856 US EXAM PELVIC COMPLETE: CPT

## 2022-09-12 ENCOUNTER — HOSPITAL ENCOUNTER (OUTPATIENT)
Dept: ULTRASOUND IMAGING | Age: 22
Discharge: HOME | End: 2022-09-12
Payer: COMMERCIAL

## 2022-09-12 DIAGNOSIS — N63.42 SUBAREOLAR LUMP OF LEFT BREAST: ICD-10-CM

## 2022-09-12 PROCEDURE — 76641 ULTRASOUND BREAST COMPLETE: CPT

## 2023-06-09 ENCOUNTER — HOSPITAL ENCOUNTER (OUTPATIENT)
Age: 23
Discharge: HOME | End: 2023-06-10
Attending: OBSTETRICS & GYNECOLOGY | Admitting: OBSTETRICS & GYNECOLOGY
Payer: COMMERCIAL

## 2023-06-09 PROCEDURE — 86900 BLOOD TYPING SEROLOGIC ABO: CPT

## 2023-06-09 PROCEDURE — 85025 COMPLETE CBC W/AUTO DIFF WBC: CPT

## 2023-06-09 PROCEDURE — 86901 BLOOD TYPING SEROLOGIC RH(D): CPT

## 2023-06-09 PROCEDURE — 80307 DRUG TEST PRSMV CHEM ANLYZR: CPT

## 2023-06-09 PROCEDURE — 6360000002 HC RX W HCPCS

## 2023-06-09 PROCEDURE — 86850 RBC ANTIBODY SCREEN: CPT

## 2023-06-09 PROCEDURE — 86780 TREPONEMA PALLIDUM: CPT

## 2023-06-09 RX ORDER — FERROUS SULFATE 325(65) MG
1 TABLET ORAL 2 TIMES DAILY
COMMUNITY
Start: 2023-06-01

## 2023-06-09 RX ORDER — MORPHINE SULFATE 4 MG/ML
5 INJECTION, SOLUTION INTRAMUSCULAR; INTRAVENOUS EVERY 4 HOURS PRN
Status: DISCONTINUED | OUTPATIENT
Start: 2023-06-09 | End: 2023-06-09

## 2023-06-09 RX ORDER — MORPHINE SULFATE 4 MG/ML
INJECTION, SOLUTION INTRAMUSCULAR; INTRAVENOUS
Status: COMPLETED
Start: 2023-06-09 | End: 2023-06-09

## 2023-06-09 RX ORDER — DOCUSATE SODIUM 100 MG/1
1 CAPSULE, LIQUID FILLED ORAL 2 TIMES DAILY
COMMUNITY
Start: 2023-06-01

## 2023-06-09 RX ORDER — MORPHINE SULFATE 4 MG/ML
4 INJECTION, SOLUTION INTRAMUSCULAR; INTRAVENOUS EVERY 4 HOURS PRN
Status: DISCONTINUED | OUTPATIENT
Start: 2023-06-09 | End: 2023-06-10 | Stop reason: HOSPADM

## 2023-06-09 RX ADMIN — MORPHINE SULFATE 4 MG: 4 INJECTION, SOLUTION INTRAMUSCULAR; INTRAVENOUS at 23:56

## 2023-06-09 ASSESSMENT — PAIN SCALES - GENERAL: PAINLEVEL_OUTOF10: 8

## 2023-06-09 ASSESSMENT — PAIN DESCRIPTION - LOCATION: LOCATION: BACK

## 2023-06-10 VITALS
RESPIRATION RATE: 16 BRPM | HEART RATE: 94 BPM | DIASTOLIC BLOOD PRESSURE: 56 MMHG | HEIGHT: 64 IN | SYSTOLIC BLOOD PRESSURE: 102 MMHG | BODY MASS INDEX: 23.9 KG/M2 | WEIGHT: 140 LBS | TEMPERATURE: 98 F

## 2023-06-10 LAB
ABO + RH BLD: NORMAL
AMPHETAMINES UR QL SCN>1000 NG/ML: NORMAL
BARBITURATES UR QL SCN>200 NG/ML: NORMAL
BASOPHILS # BLD: 0.1 K/UL (ref 0–0.2)
BASOPHILS NFR BLD: 0.4 %
BENZODIAZ UR QL SCN>200 NG/ML: NORMAL
BLD GP AB SCN SERPL QL: NORMAL
BUPRENORPHINE+NOR UR QL SCN: NORMAL
CANNABINOIDS UR QL SCN>50 NG/ML: NORMAL
COCAINE UR QL SCN: NORMAL
DEPRECATED RDW RBC AUTO: 14.7 % (ref 12.4–15.4)
DRUG SCREEN COMMENT UR-IMP: NORMAL
EOSINOPHIL # BLD: 0.1 K/UL (ref 0–0.6)
EOSINOPHIL NFR BLD: 0.4 %
FENTANYL SCREEN, URINE: NORMAL
HCT VFR BLD AUTO: 36.1 % (ref 36–48)
HGB BLD-MCNC: 12.4 G/DL (ref 12–16)
LYMPHOCYTES # BLD: 1.7 K/UL (ref 1–5.1)
LYMPHOCYTES NFR BLD: 13.1 %
MCH RBC QN AUTO: 32.3 PG (ref 26–34)
MCHC RBC AUTO-ENTMCNC: 34.2 G/DL (ref 31–36)
MCV RBC AUTO: 94.3 FL (ref 80–100)
METHADONE UR QL SCN>300 NG/ML: NORMAL
MONOCYTES # BLD: 0.9 K/UL (ref 0–1.3)
MONOCYTES NFR BLD: 7.1 %
NEUTROPHILS # BLD: 10.3 K/UL (ref 1.7–7.7)
NEUTROPHILS NFR BLD: 79 %
OPIATES UR QL SCN>300 NG/ML: NORMAL
OXYCODONE UR QL SCN: NORMAL
PCP UR QL SCN>25 NG/ML: NORMAL
PH UR STRIP: 6 [PH]
PLATELET # BLD AUTO: 200 K/UL (ref 135–450)
PMV BLD AUTO: 9.4 FL (ref 5–10.5)
RBC # BLD AUTO: 3.83 M/UL (ref 4–5.2)
REAGIN+T PALLIDUM IGG+IGM SERPL-IMP: NORMAL
WBC # BLD AUTO: 13.1 K/UL (ref 4–11)

## 2023-06-10 PROCEDURE — 99211 OFF/OP EST MAY X REQ PHY/QHP: CPT

## 2023-06-10 NOTE — H&P
status    Plan:   1. Admit to L&D for observation and morphine rest  2. Admission labs drawn  3. Patient was counseled on plan of care including risks and expectations. 4. Re-evaluate cervix as needed or tomorrow AM to determine final plan.     Ana Crawford MD

## 2023-06-10 NOTE — PROGRESS NOTES
Pt verbalized understanding of verbal and written discharge instructions and denies having questions at this time. Pt left OB unit at 0731 ambulatory, undelivered, and in stable condition, accompanied by FOB. Patient is not in active labor.

## 2023-06-10 NOTE — PROGRESS NOTES
Called Dr. Tinoco Jurist to update that SVE was 5/70/-2. Orders given to keep for observation overnight and recheck in the morning.  Ok to give 4 mg of morphine q4 hours for therapeutic rest. Will call back if anything changes overnight

## 2023-06-10 NOTE — DISCHARGE INSTRUCTIONS
nausea  Drink plenty of water, low sodium and noncaffeinated drinks.   DO NOT take any medication that is not approved by your physician first    Swelling (Edema)  Avoid standing for long periods, keep legs up when you can  When resting, lie on your side (left side is best)  Limit the amount of salty foods you eat  Try to wear support hose as much as possible    Exercise  Avoid situations that would cause you to become overheated  Exercise outdoors only if the weather is reasonable and not too hot  Do not over exert yourself when you exercise  Drink plenty of fluids, especially water  Wear good support hose, bra and shoes when exercising    Varicose Veins  Try to keep your legs elevated when you are sitting  When lying down, keep your legs elevated  Do not stand for long periods of time  When wearing stockings or socks, make sure they are not too tight and bind your legs  Wear support hose/stockings at all times  If you have a job where you sit a lot, get up periodically and walk around

## 2023-06-10 NOTE — PROGRESS NOTES
Department of Obstetrics and Gynecology  OB/GYN Attending   Progress Note       SUBJECTIVE:  +comfortable. Sleeping upon entering exam room. Reports rested well overnight. Vitals:    06/10/23 0620   BP: (!) 102/56   Pulse: 94   Resp: 16   Temp: 98 °F (36.7 °C)        Fetal heart rate:       Baseline Heart Rate:  120 bpm       Accelerations:  present       Long Term Variability:  moderate       Decelerations:  absent          Contraction frequency: occasional    Cervix:          Dilation:  4-5 cm         Effacement:  70%         Station:  -2         Position:  mid    ASSESSMENT & PLAN:  IUP at 37.5 weeks  Bladen Radford contractions    -FHT reassuring  -s/p therapeutic morphine rest overnight  -cervix unchanged upon repeat labor check since last night. Not in active labor  -Labor/emergency/call precautions reviewed  -stable for discharge home. Follow up in office this week as scheduled for routine [facility] visit.     Carmen Garcia DO  OB/GYN

## 2023-06-10 NOTE — PROGRESS NOTES
Patient arrived to [facility] with reports of contractions and vaginal pressure. States she is 37.4 weeks pregnant and she delivered her first baby at 37.5 weeks. Visibly uncomfortable at window. Escorted to T4, provided with gown and given specimen cup for urine sample. EFM applied, triage admission completed. Dr. Brianna Goldman at bedside with SVE 4/70. Order to repeat SVE in 2 hours.

## 2023-06-10 NOTE — PROGRESS NOTES
Patient denies recent SIC, denies vaginal bleeding and denies LOF. Reports consistent fetal movement. normal

## 2023-06-10 NOTE — PROGRESS NOTES
Dr. Sandoval Rings at bedside. SVE unchanged from previous exam. Jessica Grates to discharge home at this time.

## 2023-06-12 PROBLEM — Z37.9 NORMAL LABOR: Status: ACTIVE | Noted: 2023-06-12

## 2024-07-07 ENCOUNTER — HOSPITAL ENCOUNTER (EMERGENCY)
Age: 24
Discharge: HOME | End: 2024-07-07
Attending: EMERGENCY MEDICINE
Payer: COMMERCIAL

## 2024-07-07 ENCOUNTER — APPOINTMENT (OUTPATIENT)
Dept: ULTRASOUND IMAGING | Age: 24
End: 2024-07-07
Payer: COMMERCIAL

## 2024-07-07 VITALS
RESPIRATION RATE: 16 BRPM | BODY MASS INDEX: 18.78 KG/M2 | WEIGHT: 110 LBS | OXYGEN SATURATION: 99 % | DIASTOLIC BLOOD PRESSURE: 74 MMHG | SYSTOLIC BLOOD PRESSURE: 117 MMHG | HEIGHT: 64 IN | HEART RATE: 94 BPM | TEMPERATURE: 97.8 F

## 2024-07-07 DIAGNOSIS — O46.90 VAGINAL BLEEDING IN PREGNANCY: Primary | ICD-10-CM

## 2024-07-07 DIAGNOSIS — N94.9 ADNEXAL CYST: ICD-10-CM

## 2024-07-07 DIAGNOSIS — O36.80X0 PREGNANCY, LOCATION UNKNOWN: ICD-10-CM

## 2024-07-07 LAB
ABO + RH BLD: NORMAL
ALBUMIN SERPL-MCNC: 4.4 G/DL (ref 3.4–5)
ALBUMIN/GLOB SERPL: 1.3 {RATIO} (ref 1.1–2.2)
ALP SERPL-CCNC: 68 U/L (ref 40–129)
ALT SERPL-CCNC: 12 U/L (ref 10–40)
ANION GAP SERPL CALCULATED.3IONS-SCNC: 11 MMOL/L (ref 3–16)
AST SERPL-CCNC: 15 U/L (ref 15–37)
B-HCG SERPL EIA 3RD IS-ACNC: 146.9 MIU/ML
BACTERIA GENITAL QL WET PREP: NORMAL
BACTERIA URNS QL MICRO: ABNORMAL /HPF
BASOPHILS # BLD: 0 K/UL (ref 0–0.2)
BASOPHILS NFR BLD: 0.9 %
BILIRUB SERPL-MCNC: 0.3 MG/DL (ref 0–1)
BILIRUB UR QL STRIP.AUTO: NEGATIVE
BUN SERPL-MCNC: 8 MG/DL (ref 7–20)
CALCIUM SERPL-MCNC: 9.1 MG/DL (ref 8.3–10.6)
CHLORIDE SERPL-SCNC: 102 MMOL/L (ref 99–110)
CLARITY UR: CLEAR
CLUE CELLS SPEC QL WET PREP: NORMAL
CO2 SERPL-SCNC: 25 MMOL/L (ref 21–32)
COLOR UR: YELLOW
CREAT SERPL-MCNC: 0.6 MG/DL (ref 0.6–1.1)
DEPRECATED RDW RBC AUTO: 12.6 % (ref 12.4–15.4)
EOSINOPHIL # BLD: 0 K/UL (ref 0–0.6)
EOSINOPHIL NFR BLD: 1.2 %
EPI CELLS #/AREA URNS HPF: ABNORMAL /HPF (ref 0–5)
EPI CELLS SPEC QL WET PREP: NORMAL
GFR SERPLBLD CREATININE-BSD FMLA CKD-EPI: >90 ML/MIN/{1.73_M2}
GLUCOSE SERPL-MCNC: 93 MG/DL (ref 70–99)
GLUCOSE UR STRIP.AUTO-MCNC: NEGATIVE MG/DL
HCT VFR BLD AUTO: 36.3 % (ref 36–48)
HGB BLD-MCNC: 12.4 G/DL (ref 12–16)
HGB UR QL STRIP.AUTO: ABNORMAL
KETONES UR STRIP.AUTO-MCNC: NEGATIVE MG/DL
LEUKOCYTE ESTERASE UR QL STRIP.AUTO: NEGATIVE
LYMPHOCYTES # BLD: 1.2 K/UL (ref 1–5.1)
LYMPHOCYTES NFR BLD: 44.3 %
MCH RBC QN AUTO: 31.1 PG (ref 26–34)
MCHC RBC AUTO-ENTMCNC: 34.1 G/DL (ref 31–36)
MCV RBC AUTO: 91.3 FL (ref 80–100)
MONOCYTES # BLD: 0.2 K/UL (ref 0–1.3)
MONOCYTES NFR BLD: 8.3 %
MUCOUS THREADS #/AREA URNS LPF: ABNORMAL /LPF
NEUTROPHILS # BLD: 1.2 K/UL (ref 1.7–7.7)
NEUTROPHILS NFR BLD: 45.3 %
NITRITE UR QL STRIP.AUTO: NEGATIVE
PH UR STRIP.AUTO: 6 [PH] (ref 5–8)
PLATELET # BLD AUTO: 160 K/UL (ref 135–450)
PMV BLD AUTO: 9 FL (ref 5–10.5)
POTASSIUM SERPL-SCNC: 3.9 MMOL/L (ref 3.5–5.1)
PROT SERPL-MCNC: 7.9 G/DL (ref 6.4–8.2)
PROT UR STRIP.AUTO-MCNC: NEGATIVE MG/DL
RBC # BLD AUTO: 3.98 M/UL (ref 4–5.2)
RBC #/AREA URNS HPF: >100 /HPF (ref 0–4)
RBC SPEC QL WET PREP: NORMAL
SODIUM SERPL-SCNC: 138 MMOL/L (ref 136–145)
SP GR UR STRIP.AUTO: 1.01 (ref 1–1.03)
SPECIMEN SOURCE FLD: NORMAL
T VAGINALIS GENITAL QL WET PREP: NORMAL
UA COMPLETE W REFLEX CULTURE PNL UR: ABNORMAL
UA DIPSTICK W REFLEX MICRO PNL UR: YES
URN SPEC COLLECT METH UR: ABNORMAL
UROBILINOGEN UR STRIP-ACNC: 0.2 E.U./DL
WBC # BLD AUTO: 2.6 K/UL (ref 4–11)
WBC #/AREA URNS HPF: ABNORMAL /HPF (ref 0–5)
WBC SPEC QL WET PREP: NORMAL
YEAST GENITAL QL WET PREP: NORMAL

## 2024-07-07 PROCEDURE — 99284 EMERGENCY DEPT VISIT MOD MDM: CPT

## 2024-07-07 PROCEDURE — 87491 CHLMYD TRACH DNA AMP PROBE: CPT

## 2024-07-07 PROCEDURE — 81001 URINALYSIS AUTO W/SCOPE: CPT

## 2024-07-07 PROCEDURE — 87210 SMEAR WET MOUNT SALINE/INK: CPT

## 2024-07-07 PROCEDURE — 86900 BLOOD TYPING SEROLOGIC ABO: CPT

## 2024-07-07 PROCEDURE — 87591 N.GONORRHOEAE DNA AMP PROB: CPT

## 2024-07-07 PROCEDURE — 80053 COMPREHEN METABOLIC PANEL: CPT

## 2024-07-07 PROCEDURE — 85025 COMPLETE CBC W/AUTO DIFF WBC: CPT

## 2024-07-07 PROCEDURE — 84702 CHORIONIC GONADOTROPIN TEST: CPT

## 2024-07-07 PROCEDURE — 86901 BLOOD TYPING SEROLOGIC RH(D): CPT

## 2024-07-07 PROCEDURE — 76817 TRANSVAGINAL US OBSTETRIC: CPT

## 2024-07-07 ASSESSMENT — PAIN - FUNCTIONAL ASSESSMENT: PAIN_FUNCTIONAL_ASSESSMENT: 0-10

## 2024-07-07 ASSESSMENT — PAIN SCALES - GENERAL: PAINLEVEL_OUTOF10: 4

## 2024-07-07 NOTE — DISCHARGE INSTRUCTIONS
Please return on Tuesday, July 9 to have your quantitative hCG blood test collected.  Call your OB office tomorrow to schedule an appointment, they want to see you on Wednesday or Thursday of this week to evaluate your labs and repeat your ultrasound.  Return to the nearest emergency department if your abdominal pain worsens or you develop any other symptoms such as bleeding that is increasing beyond 1 pad per hour or worsening pain.

## 2024-07-07 NOTE — CONSULTS
[facility]1758 [facility]  RE:Miscarriage with adnexal mass  2nd page @ 1830  Jacey Goodman DO @ 1834

## 2024-07-07 NOTE — ED PROVIDER NOTES
[facility]  ED     EMERGENCY DEPARTMENT ENCOUNTER     Location: [facility]  ED  2024  Note Started: 5:15 PM EDT 24      Patient Identification  Julia Araujo is a 24 y.o. female      HPI:Julia Araujo was evaluated in the Emergency Department for vaginal bleeding in early pregnancy.  Patient estimates that she has 5 to 6 weeks pregnant.   female.  [facility]  Has noted spotting over the last 3 to 4 days.  Mild pelvic cramping reported.. Although initial history and physical exam information was obtained by JOSSIE/NPP/MD/ (who also dictated a record of this visit), I personally saw the patient and performed and made/approved the management plan and take responsibility for the patient management.      PHYSICAL EXAM:    General: No acute distress.  Head: Normocephalic/atraumatic.  Heart: Regular rhythm and normal S1-S2.  Lungs: Clear to auscultation bilaterally.  No wheezes, rales, rhonchi.:  Soft.  Mild suprapubic tenderness to palpation.  No rebound or guarding.   exam completed by nurse practitioner.    Patient seen and evaluated.  Relevant records reviewed.  MDM  Patient presents to the emergency department with vaginal spotting/bleeding in early pregnancy.   female.  Stable vitals.      I independently interpreted the following studies:   CBC: Noted leukopenia with stable hemoglobin of 12.4.  Renal function/liver function stable.  hCG 146.  No evidence of IUP or ectopic pregnancy on ultrasound.  Complicated right adnexal cystic lesion was noted to likely be a hemorrhagic cyst.  This was discussed with OB/GYN and outpatient follow-up was recommended.      CLINICAL IMPRESSION  1. Vaginal bleeding in pregnancy    2. Pregnancy, location unknown    3. Adnexal cyst          Sha RITTER II, DO, am the primary clinician of record.   I personally saw the patient and independently provided 0 minutes of non-concurrent critical care 
Factor V Leiden    Asthma Father     Other Brother         Factor V Leiden    COPD Mother           SOCIAL HISTORY       Social History     Socioeconomic History    Marital status: Single   Tobacco Use    Smoking status: Every Day     Current packs/day: 0.50     Average packs/day: 0.5 packs/day for 1 year (0.5 ttl pk-yrs)     Types: Cigarettes    Smokeless tobacco: Never   Vaping Use    Vaping Use: Never used   Substance and Sexual Activity    Alcohol use: No    Drug use: No    Sexual activity: Yes     Partners: Male       SCREENINGS    Baker Coma Scale  Eye Opening: Spontaneous  Best Verbal Response: Oriented  Best Motor Response: Obeys commands  Gagandeep Coma Scale Score: 15        PHYSICAL EXAM    (up to 7 for level 4, 8 or more for level 5)     ED Triage Vitals [07/07/24 1540]   BP Temp Temp src Pulse Respirations SpO2 Height Weight - Scale   117/74 97.8 °F (36.6 °C) -- 94 16 99 % 1.626 m (5' 4\") 49.9 kg (110 lb)       Physical Exam  Vitals and nursing note reviewed. Exam conducted with a chaperone present.   Constitutional:       Appearance: Normal appearance. She is not ill-appearing or diaphoretic.   HENT:      Head: Normocephalic and atraumatic.      Right Ear: External ear normal.      Left Ear: External ear normal.      Mouth/Throat:      Mouth: Mucous membranes are moist.      Pharynx: Oropharynx is clear.   Eyes:      General:         Right eye: No discharge.         Left eye: No discharge.   Cardiovascular:      Rate and Rhythm: Normal rate and regular rhythm.      Pulses: Normal pulses.           Radial pulses are 2+ on the right side and 2+ on the left side.      Heart sounds: Normal heart sounds. No murmur heard.     No friction rub. No gallop.   Pulmonary:      Effort: Pulmonary effort is normal. No tachypnea, bradypnea, accessory muscle usage, prolonged expiration or respiratory distress.      Breath sounds: Normal breath sounds. No stridor. No wheezing, rhonchi or rales.   Abdominal:

## 2024-07-09 ENCOUNTER — HOSPITAL ENCOUNTER (OUTPATIENT)
Age: 24
Discharge: HOME | End: 2024-07-09
Payer: COMMERCIAL

## 2024-07-09 LAB — B-HCG SERPL EIA 3RD IS-ACNC: 58 MIU/ML

## 2024-07-09 PROCEDURE — 84702 CHORIONIC GONADOTROPIN TEST: CPT

## 2024-07-09 PROCEDURE — 36415 COLL VENOUS BLD VENIPUNCTURE: CPT

## 2024-07-10 LAB
C TRACH DNA UR QL NAA+PROBE: NEGATIVE
N GONORRHOEA DNA UR QL NAA+PROBE: NEGATIVE

## 2025-04-28 ENCOUNTER — HOSPITAL ENCOUNTER (INPATIENT)
Age: 25
LOS: 1 days | Discharge: HOME | End: 2025-04-29
Attending: OBSTETRICS & GYNECOLOGY | Admitting: OBSTETRICS & GYNECOLOGY
Payer: COMMERCIAL

## 2025-04-28 LAB
ABO/RH: NORMAL
AMPHETAMINES UR QL SCN>1000 NG/ML: NORMAL
ANTIBODY SCREEN: NORMAL
BARBITURATES UR QL SCN>200 NG/ML: NORMAL
BASOPHILS # BLD: 0.1 K/UL (ref 0–0.2)
BASOPHILS NFR BLD: 1 %
BENZODIAZ UR QL SCN>200 NG/ML: NORMAL
BUPRENORPHINE+NOR UR QL SCN: NORMAL
CANNABINOIDS UR QL SCN>50 NG/ML: NORMAL
COCAINE UR QL SCN: NORMAL
DEPRECATED RDW RBC AUTO: 13.9 % (ref 12.4–15.4)
DRUG SCREEN COMMENT UR-IMP: NORMAL
EOSINOPHIL # BLD: 0 K/UL (ref 0–0.6)
EOSINOPHIL NFR BLD: 0.4 %
FENTANYL SCREEN, URINE: NORMAL
HCT VFR BLD AUTO: 33.9 % (ref 36–48)
HGB BLD-MCNC: 11.5 G/DL (ref 12–16)
LYMPHOCYTES # BLD: 1.5 K/UL (ref 1–5.1)
LYMPHOCYTES NFR BLD: 15.1 %
MCH RBC QN AUTO: 30.8 PG (ref 26–34)
MCHC RBC AUTO-ENTMCNC: 33.7 G/DL (ref 31–36)
MCV RBC AUTO: 91.4 FL (ref 80–100)
METHADONE UR QL SCN>300 NG/ML: NORMAL
MONOCYTES # BLD: 0.7 K/UL (ref 0–1.3)
MONOCYTES NFR BLD: 6.9 %
NEUTROPHILS # BLD: 7.8 K/UL (ref 1.7–7.7)
NEUTROPHILS NFR BLD: 76.6 %
OPIATES UR QL SCN>300 NG/ML: NORMAL
OXYCODONE UR QL SCN: NORMAL
PCP UR QL SCN>25 NG/ML: NORMAL
PH UR STRIP: 6 [PH]
PLATELET # BLD AUTO: 199 K/UL (ref 135–450)
PMV BLD AUTO: 9.3 FL (ref 5–10.5)
RBC # BLD AUTO: 3.71 M/UL (ref 4–5.2)
REAGIN+T PALLIDUM IGG+IGM SERPL-IMP: NORMAL
WBC # BLD AUTO: 10.1 K/UL (ref 4–11)

## 2025-04-28 PROCEDURE — 0KQM0ZZ REPAIR PERINEUM MUSCLE, OPEN APPROACH: ICD-10-PCS | Performed by: OBSTETRICS & GYNECOLOGY

## 2025-04-28 PROCEDURE — 86900 BLOOD TYPING SEROLOGIC ABO: CPT

## 2025-04-28 PROCEDURE — 86850 RBC ANTIBODY SCREEN: CPT

## 2025-04-28 PROCEDURE — 85025 COMPLETE CBC W/AUTO DIFF WBC: CPT

## 2025-04-28 PROCEDURE — 86901 BLOOD TYPING SEROLOGIC RH(D): CPT

## 2025-04-28 PROCEDURE — 86780 TREPONEMA PALLIDUM: CPT

## 2025-04-28 PROCEDURE — 2500000003 HC RX 250 WO HCPCS: Performed by: OBSTETRICS & GYNECOLOGY

## 2025-04-28 PROCEDURE — 6370000000 HC RX 637 (ALT 250 FOR IP): Performed by: OBSTETRICS & GYNECOLOGY

## 2025-04-28 PROCEDURE — 1220000000 HC SEMI PRIVATE OB R&B

## 2025-04-28 PROCEDURE — 6360000002 HC RX W HCPCS

## 2025-04-28 PROCEDURE — 7200000001 HC VAGINAL DELIVERY

## 2025-04-28 PROCEDURE — 80307 DRUG TEST PRSMV CHEM ANLYZR: CPT

## 2025-04-28 RX ORDER — ONDANSETRON 2 MG/ML
4 INJECTION INTRAMUSCULAR; INTRAVENOUS EVERY 6 HOURS PRN
Status: DISCONTINUED | OUTPATIENT
Start: 2025-04-28 | End: 2025-04-29 | Stop reason: HOSPADM

## 2025-04-28 RX ORDER — SODIUM CHLORIDE 0.9 % (FLUSH) 0.9 %
5-40 SYRINGE (ML) INJECTION PRN
Status: DISCONTINUED | OUTPATIENT
Start: 2025-04-28 | End: 2025-04-29 | Stop reason: HOSPADM

## 2025-04-28 RX ORDER — TERBUTALINE SULFATE 1 MG/ML
0.25 INJECTION SUBCUTANEOUS
Status: DISCONTINUED | OUTPATIENT
Start: 2025-04-28 | End: 2025-04-28

## 2025-04-28 RX ORDER — KETOROLAC TROMETHAMINE 30 MG/ML
30 INJECTION, SOLUTION INTRAMUSCULAR; INTRAVENOUS ONCE
Status: DISCONTINUED | OUTPATIENT
Start: 2025-04-28 | End: 2025-04-28

## 2025-04-28 RX ORDER — KETOROLAC TROMETHAMINE 30 MG/ML
30 INJECTION, SOLUTION INTRAMUSCULAR; INTRAVENOUS EVERY 6 HOURS PRN
Status: DISCONTINUED | OUTPATIENT
Start: 2025-04-28 | End: 2025-04-28

## 2025-04-28 RX ORDER — METHYLERGONOVINE MALEATE 0.2 MG/ML
200 INJECTION INTRAVENOUS PRN
Status: DISCONTINUED | OUTPATIENT
Start: 2025-04-28 | End: 2025-04-28

## 2025-04-28 RX ORDER — SODIUM CHLORIDE, SODIUM LACTATE, POTASSIUM CHLORIDE, AND CALCIUM CHLORIDE .6; .31; .03; .02 G/100ML; G/100ML; G/100ML; G/100ML
500 INJECTION, SOLUTION INTRAVENOUS PRN
Status: DISCONTINUED | OUTPATIENT
Start: 2025-04-28 | End: 2025-04-28

## 2025-04-28 RX ORDER — MISOPROSTOL 100 UG/1
400 TABLET ORAL PRN
Status: DISCONTINUED | OUTPATIENT
Start: 2025-04-28 | End: 2025-04-28

## 2025-04-28 RX ORDER — POLYETHYLENE GLYCOL 3350 17 G/17G
17 POWDER, FOR SOLUTION ORAL DAILY
Status: DISCONTINUED | OUTPATIENT
Start: 2025-04-28 | End: 2025-04-29 | Stop reason: HOSPADM

## 2025-04-28 RX ORDER — ACETAMINOPHEN 500 MG
1000 TABLET ORAL EVERY 8 HOURS SCHEDULED
Status: DISCONTINUED | OUTPATIENT
Start: 2025-04-28 | End: 2025-04-29 | Stop reason: HOSPADM

## 2025-04-28 RX ORDER — SIMETHICONE 80 MG
80 TABLET,CHEWABLE ORAL EVERY 6 HOURS PRN
Status: DISCONTINUED | OUTPATIENT
Start: 2025-04-28 | End: 2025-04-29 | Stop reason: HOSPADM

## 2025-04-28 RX ORDER — LIDOCAINE HYDROCHLORIDE 10 MG/ML
5 INJECTION, SOLUTION EPIDURAL; INFILTRATION; INTRACAUDAL; PERINEURAL ONCE
Status: DISCONTINUED | OUTPATIENT
Start: 2025-04-28 | End: 2025-04-28

## 2025-04-28 RX ORDER — DOCUSATE SODIUM 100 MG/1
100 CAPSULE, LIQUID FILLED ORAL 2 TIMES DAILY
Status: DISCONTINUED | OUTPATIENT
Start: 2025-04-28 | End: 2025-04-28

## 2025-04-28 RX ORDER — ONDANSETRON 4 MG/1
4 TABLET, ORALLY DISINTEGRATING ORAL EVERY 8 HOURS PRN
Status: DISCONTINUED | OUTPATIENT
Start: 2025-04-28 | End: 2025-04-28

## 2025-04-28 RX ORDER — SODIUM CHLORIDE, SODIUM LACTATE, POTASSIUM CHLORIDE, AND CALCIUM CHLORIDE .6; .31; .03; .02 G/100ML; G/100ML; G/100ML; G/100ML
1000 INJECTION, SOLUTION INTRAVENOUS PRN
Status: DISCONTINUED | OUTPATIENT
Start: 2025-04-28 | End: 2025-04-28

## 2025-04-28 RX ORDER — CARBOPROST TROMETHAMINE 250 UG/ML
250 INJECTION, SOLUTION INTRAMUSCULAR PRN
Status: DISCONTINUED | OUTPATIENT
Start: 2025-04-28 | End: 2025-04-28

## 2025-04-28 RX ORDER — TRANEXAMIC ACID 10 MG/ML
1000 INJECTION, SOLUTION INTRAVENOUS
Status: DISCONTINUED | OUTPATIENT
Start: 2025-04-28 | End: 2025-04-28

## 2025-04-28 RX ORDER — ONDANSETRON 2 MG/ML
4 INJECTION INTRAMUSCULAR; INTRAVENOUS EVERY 6 HOURS PRN
Status: DISCONTINUED | OUTPATIENT
Start: 2025-04-28 | End: 2025-04-28

## 2025-04-28 RX ORDER — FERROUS SULFATE 325(65) MG
325 TABLET ORAL 2 TIMES DAILY WITH MEALS
Status: DISCONTINUED | OUTPATIENT
Start: 2025-04-28 | End: 2025-04-29 | Stop reason: HOSPADM

## 2025-04-28 RX ORDER — ONDANSETRON 4 MG/1
4 TABLET, ORALLY DISINTEGRATING ORAL EVERY 6 HOURS PRN
Status: DISCONTINUED | OUTPATIENT
Start: 2025-04-28 | End: 2025-04-29 | Stop reason: HOSPADM

## 2025-04-28 RX ORDER — FAMOTIDINE 20 MG/1
20 TABLET, FILM COATED ORAL 2 TIMES DAILY
Status: DISCONTINUED | OUTPATIENT
Start: 2025-04-28 | End: 2025-04-29 | Stop reason: HOSPADM

## 2025-04-28 RX ORDER — KETOROLAC TROMETHAMINE 30 MG/ML
INJECTION, SOLUTION INTRAMUSCULAR; INTRAVENOUS
Status: COMPLETED
Start: 2025-04-28 | End: 2025-04-28

## 2025-04-28 RX ORDER — DOCUSATE SODIUM 100 MG/1
100 CAPSULE, LIQUID FILLED ORAL 2 TIMES DAILY
Status: DISCONTINUED | OUTPATIENT
Start: 2025-04-28 | End: 2025-04-29 | Stop reason: HOSPADM

## 2025-04-28 RX ORDER — IBUPROFEN 800 MG/1
800 TABLET, FILM COATED ORAL EVERY 8 HOURS SCHEDULED
Status: DISCONTINUED | OUTPATIENT
Start: 2025-04-28 | End: 2025-04-29 | Stop reason: HOSPADM

## 2025-04-28 RX ADMIN — KETOROLAC TROMETHAMINE 30 MG: 30 INJECTION, SOLUTION INTRAMUSCULAR at 05:07

## 2025-04-28 RX ADMIN — POLYETHYLENE GLYCOL 3350 17 G: 17 POWDER, FOR SOLUTION ORAL at 11:11

## 2025-04-28 RX ADMIN — KETOROLAC TROMETHAMINE 30 MG: 30 INJECTION, SOLUTION INTRAMUSCULAR; INTRAVENOUS at 05:07

## 2025-04-28 RX ADMIN — DOCUSATE SODIUM 100 MG: 100 CAPSULE, LIQUID FILLED ORAL at 11:11

## 2025-04-28 RX ADMIN — IBUPROFEN 800 MG: 800 TABLET, FILM COATED ORAL at 18:30

## 2025-04-28 RX ADMIN — Medication 10 ML: at 11:11

## 2025-04-28 ASSESSMENT — PAIN - FUNCTIONAL ASSESSMENT: PAIN_FUNCTIONAL_ASSESSMENT: ACTIVITIES ARE NOT PREVENTED

## 2025-04-28 ASSESSMENT — PAIN SCALES - GENERAL
PAINLEVEL_OUTOF10: 0
PAINLEVEL_OUTOF10: 4

## 2025-04-28 NOTE — L&D DELIVERY NOTE
Department of Obstetrics and Gynecology  Spontaneous Vaginal Delivery Note     Anesthesia:  none    Delivery Summary: 24yo  @ 38w2d presented in active labor at 8cm dilation.   I was called to room for delivery.  Head delivered OA over intact perineum.  Shoulders and body delivered easily and spontaneously crying infant was placed on mother's abdomen.  After approximately 1 minute, the cord was double clamped.  The baby's father cut the cord.  Placenta delivered intact with gentle traction on cord.  Fundus was massaged and found to be firm.  A second degree laceration was repaired with 3-0 Vicryl in usual fashion under local anesthesia (1% lidocaine, approximately 15ml used).  Vagina was cleared of remaining blood and clots.  There were no foreign bodies.  Mother and baby girl tolerated delivery well.  APGAR 8/9,  ml.     Condition:  infant and mother stable      Kalpana Raya MD    Van, Girl Julia [[id number]]      Labor Events     Labor: No   Steroids: None  Cervical Ripening Date/Time:      Antibiotics Received during Labor: No  Rupture Identifier: Sac 1  Rupture Date/Time:  25 03:00:00   Rupture Type: SROM  Fluid Color: Clear  Fluid Odor: None  Induction: None  Augmentation: None  Labor Complications: Precipitous Labor       Anesthesia    Method: None       Labor Event Times      Labor onset date/time:  25 01:30:00     Dilation complete date/time:        Start pushing date/time:     Decision date/time (emergent ):            Delivery Details      Delivery Date: 25 Delivery Time: 04:35:00   Delivery Type: Vaginal, Spontaneous              [place] Presentation    Presentation: Vertex  _: Occiput  _: Anterior       Shoulder Dystocia    Shoulder Dystocia Present?: No       Assisted Delivery Details    Forceps Attempted?: No  Vacuum Extractor Attempted?: No                           Cord    Vessels: 3 Vessels  Complications: None  Delayed Cord Clamping?:

## 2025-04-28 NOTE — PROGRESS NOTES
MOB declines bath at this time.  MOB request to \"nap\".  Will call this nurse when she/infant is awake and ready for bath and vitals.

## 2025-04-28 NOTE — H&P
[facility]  History and Physical      CHIEF COMPLAINT: contractions                                                                                                                                                                                                                                                                                                                               HISTORY OF PRESENT ILLNESS:      Julia Guerrero is a 25 y.o. y.o.  at Unknown by LMP confirmed by 9w0d ultrasound, Estimated Date of Delivery 5/10/2025, is admitted for active labor.  Contractions started around 0130 this AM, leakage of fluid around 0300.  + fetal movement, no vaginal bleeding.      PHYSICAL EXAM:  Vitals not accepted by nurse yet, see EPIC record for details  Constitutional:  awake, alert, cooperative, no apparent distress  Fetal heart rate: CAT 1, 130bpm  Estimated fetal weight: 3200 gm  Cervix: 8/C/0,cephalic presentation.    Contraction frequency:  not measured, about every 1-2 minutes  Membranes:  ruptured, clear fluid      LABS:   Lab Results   Component Value Date    WBC 10.1 2025    HGB 11.5 (L) 2025    HCT 33.9 (L) 2025    MCV 91.4 2025     2025       OBSTETRIC HISTORY:  OB History    Para Term  AB Living   4 2 2 0 0 2   SAB IAB Ectopic Molar Multiple Live Births   0 0 0 0 0 2      # Outcome Date GA Lbr Benoit/2nd Weight Sex Type Anes PTL Lv   4 Current            3 Term 25 38w0d  2.795 kg (6 lb 2.6 oz) F Vag-Spont None N HUMZA      Name: VJ GUERRERO      Apgar1: 8  Apgar5: 9   2 Term 22 37w5d 00:42 / 01:02 2.926 kg (6 lb 7.2 oz) F Vag-Spont EPI N HUMZA      Birth Comments: [place]      Name: VJ GUERRERO      Apgar1: 9  Apgar5: 9   1                  LABS:    Blood Type/Rh:  A POS    Antibody Screen:  Neg  Rubella:  immune  RPR:  non-reactive  Hepatitis B

## 2025-04-28 NOTE — LACTATION NOTE
Ref Range Status   2020 Neg Negative <300 ng/mL Final     pH, Urine   Date Value Ref Range Status   2024 6.0 5.0 - 8.0 Final     pH, UA   Date Value Ref Range Status   2023 6.5  Final     Comment:     Urine pH less than 5.0 or greater than 8.0 may indicate sample adulteration.  Another sample should be collected if clinically  indicated.       Drug Screen Comment:   Date Value Ref Range Status   2023 see below  Final     Comment:     This method is a screening test to detect only these drug  classes as part of a medical workup.  Confirmatory testing  by another method should be ordered if clinically indicated.         Other significant maternal history: Anemia, left breast mass    Delivery Information:  Born on 2023 at 4:35 AM  Delivery method: Vaginal, Spontaneous [250]  Additional Information:  Forceps attempted? No [0]  Vacuum extractor attempted? No [0]  Breech:   Complications:     Infant Assessment:    DOL:  8 hours      Feeding: Breastfeeding     I&O adequacy:  Urine output: is established  Stool output: is not established  Percent weight change from birthweight: 0%    Oral Assessment:   Palate: unable to assess this encounter     Birth Factors/Diagnosis that could create risk for breastfeeding:   Short Second Stage of labor  and Early Term     Glucose: No      Intervention During Consultation    Interventions Performed:   [place] Breastfeeding Booklet Provided , Assisted with breastfeeding , Education , and Skin to skin     Latch & Positioning: Encouraged mob to unbundled infant and place sts at left breast. MOB able to support and position infant in cradle hold to left breast. Infant active alert and rooting for breast. MINNA was achieved with first attempts. Strong rhythmic  sucking present. MOB confirms latch is comfortable. Observed infant for the first 10 minutes active sucking at breast. Continues after consult.    Manual Expression:  MOB states she understands     Bedside

## 2025-04-28 NOTE — PROGRESS NOTES
Patient actively pushing.  RN and MD remain in continuous attendance at the bedside.  Assessment & evaluation of fetal heart rate ongoing via continuous EFM.

## 2025-04-29 ENCOUNTER — LACTATION ENCOUNTER (OUTPATIENT)
Dept: NURSERY | Age: 25
End: 2025-04-29

## 2025-04-29 VITALS
TEMPERATURE: 97.8 F | DIASTOLIC BLOOD PRESSURE: 68 MMHG | RESPIRATION RATE: 16 BRPM | SYSTOLIC BLOOD PRESSURE: 111 MMHG | OXYGEN SATURATION: 98 % | HEART RATE: 86 BPM

## 2025-04-29 LAB
DEPRECATED RDW RBC AUTO: 14 % (ref 12.4–15.4)
HCT VFR BLD AUTO: 28.3 % (ref 36–48)
HGB BLD-MCNC: 9.6 G/DL (ref 12–16)
MCH RBC QN AUTO: 31 PG (ref 26–34)
MCHC RBC AUTO-ENTMCNC: 34 G/DL (ref 31–36)
MCV RBC AUTO: 91.1 FL (ref 80–100)
PLATELET # BLD AUTO: 185 K/UL (ref 135–450)
PMV BLD AUTO: 9.7 FL (ref 5–10.5)
RBC # BLD AUTO: 3.11 M/UL (ref 4–5.2)
WBC # BLD AUTO: 9.3 K/UL (ref 4–11)

## 2025-04-29 PROCEDURE — 6370000000 HC RX 637 (ALT 250 FOR IP): Performed by: OBSTETRICS & GYNECOLOGY

## 2025-04-29 PROCEDURE — 85027 COMPLETE CBC AUTOMATED: CPT

## 2025-04-29 PROCEDURE — 2500000003 HC RX 250 WO HCPCS: Performed by: OBSTETRICS & GYNECOLOGY

## 2025-04-29 PROCEDURE — 36415 COLL VENOUS BLD VENIPUNCTURE: CPT

## 2025-04-29 RX ORDER — IBUPROFEN 800 MG/1
800 TABLET, FILM COATED ORAL EVERY 8 HOURS PRN
Qty: 21 TABLET | Refills: 0 | Status: SHIPPED | OUTPATIENT
Start: 2025-04-29

## 2025-04-29 RX ORDER — FERROUS SULFATE 325(65) MG
325 TABLET ORAL EVERY OTHER DAY
Qty: 15 TABLET | Refills: 0 | Status: SHIPPED | OUTPATIENT
Start: 2025-04-29

## 2025-04-29 RX ADMIN — IBUPROFEN 800 MG: 800 TABLET, FILM COATED ORAL at 08:38

## 2025-04-29 RX ADMIN — DOCUSATE SODIUM 100 MG: 100 CAPSULE, LIQUID FILLED ORAL at 08:35

## 2025-04-29 RX ADMIN — POLYETHYLENE GLYCOL 3350 17 G: 17 POWDER, FOR SOLUTION ORAL at 08:35

## 2025-04-29 RX ADMIN — FERROUS SULFATE TAB 325 MG (65 MG ELEMENTAL FE) 325 MG: 325 (65 FE) TAB at 08:35

## 2025-04-29 RX ADMIN — Medication 10 ML: at 08:36

## 2025-04-29 RX ADMIN — FAMOTIDINE 20 MG: 20 TABLET, FILM COATED ORAL at 08:36

## 2025-04-29 ASSESSMENT — PAIN DESCRIPTION - ORIENTATION: ORIENTATION: LOWER

## 2025-04-29 ASSESSMENT — PAIN DESCRIPTION - LOCATION: LOCATION: ABDOMEN;PELVIS

## 2025-04-29 ASSESSMENT — PAIN DESCRIPTION - DESCRIPTORS: DESCRIPTORS: CRAMPING;DISCOMFORT

## 2025-04-29 ASSESSMENT — PAIN SCALES - GENERAL: PAINLEVEL_OUTOF10: 1

## 2025-04-29 NOTE — DISCHARGE INSTRUCTIONS
Thank you for the opportunity to care for you and your family.    We hope that you are happy with the care we provided during your stay in the [facility]. We want to ensure that you have the help you need when you leave the hospital. If there is anything we can assist you with, please let us know.    Breastfeeding mothers may contact our lactation specialists with any problems or questions.  [facility] lactation services phone number is [phone].  Leave a message and your call will be returned.    Please refer to the information provided in the postpartum care booklet.  The following are warning signs to remember.    Call 911 if you have:    Chest pain or pressure  Shortness of breath, even at rest  Thoughts of harming yourself or others  Seizures    Call your healthcare provider if you have:    Temperature of 100.4 degrees or higher  Stitches that are not healing        -- Swelling, bleeding, drainage, foul odor, redness or warmth in/around your           stitches, staples, or incision (scar)        -- Bad smelling blood or discharge from the vagina  Vaginal bleeding that has increased         -- Soaking through one pad in an hour        -- You are passing clots larger than the size of a lemon  Red, warm tender area(s) in your breast or calf  Headache that does not get better, even after taking medicine; or headache with vision changes    Remember to notify all healthcare providers from your date of delivery to up to one year after giving birth!    CARING FOR YOURSELF        DIET/ACTIVITY    Eat a well balanced diet focusing on foods high in fiber and protein.  Drink plenty of fluids, especially water.  To avoid constipation you may take a mild stool softener as recommended by your doctor or midwife.  Gradually increase your activity. Resume an exercise regime only after being advised by your doctor or midwife.  When sitting or lying down, keep your legs elevated to reduce swelling.  Avoid

## 2025-04-29 NOTE — LACTATION NOTE
Action/Plan:  Breastfeed on cue and at least 8 times/24 hours, unlimited timing. May not nurse this often in the first 24 hours. Wake baby and offer breastfeeding if it has been 3 hours since the beginning of last feeding. Place infant skin to skin if infant will not breastfeed at 3 hours.   Hand express prior to latch to leodan nipple and entice infant to the breast.   It is important to use gentle stimulation during feeding to promote active eating. Offer both breasts at every feeding. Burp infant in between sides. Alternate which breast is used first.   Offer STS often while awake. Mother holding infant skin to skin between feedings will promote milk supply and allow infant to rest more deeply.   Maintain a feeding log until infant is gaining weight and seen by primary care physician.   Request breastfeeding assistance from LC or RN as needed.     Feeding Plan reviewed with: Mom    Response:   Verbalized understanding of education and instruction

## 2025-04-29 NOTE — DISCHARGE SUMMARY
Obstetrical Discharge Summary  Hospital: [facility]    OB Provider Group: [facility]    Date of Delivery: 25    Delivered By: Kalpana Raya MD     Discharge Date: 2025    Antepartum problem list: history of precipitous delivery, anemia, history of anxiety not requiring medication    Anesthesia: Local    Intrapartum/Postpartum significant events: precipitous delivery    Laceration: second degree laceration    Condition:  stable    Baby: Liveborn female, Apgars 8/9, weight 3300gm, 7 #, 4.4 oz    Subjective:  25 y.o. yo  PPD # 1 s/p vaginal delivery.  Pain is controlled.  Lochia is light.  Tolerating po, ambulating, voiding without difficulty.  Feeding method: breast    Vitals:  /68   Pulse 86   Temp 97.8 °F (36.6 °C) (Oral)   Resp 16   LMP 2024 (Approximate)   SpO2 98%   Breastfeeding Unknown     CONSTITUTIONAL:  awake, alert, cooperative, no apparent distress, and appears stated age  ABDOMEN:  nontender, fundus well below umbilicus  CHEST/BREASTS:  no increased work of breathing  MUSCULOSKELETAL:  nontender legs, no edema    WBC/Hgb/Hct/Plts:  9.3/9.6/28.3/185 ( 06)     DISCHARGE DIAGNOSES:    Postpartum care following vaginal delivery  Anemia, asymptomatic    Plan:     Discharge today in good condition  Postpartum precautions reviewed with patient including excess bleeding or pain, fever, unusual vaginal discharge, unusual headache, vision changes, RUQ pain  Activity restrictions reviewed with patient: nothing in vagina for 6 weeks    Follow-up appointment with Dr. Raya for postpartum exam in 4 weeks.    Kalpana Raya MD

## 2025-04-29 NOTE — LACTATION NOTE
This note was copied from a baby's chart.  LACTATION CONSULTATION      Follow-up Consult: Reason for Follow-up: Assess needs , Discharge education , and provide family with insurance breast pump     : No    FOB holding infant at bedside, mob in shower at time of consult. FOB states that infant fed well overnight, output established. Denies questions or concerns. Lc provided fob with Zomee Z2 breast pump. Instruction to call back to room when mob is out of the shower for discharge teaching.     Name: Girl Julia Araujo       MRN: [id number]               YOB: 2023   Time of Birth: 4:35 AM   Gestational age: Gestational Age: 38w2d   Birth Weight: Birth Weight: 3.3 kg (7 lb 4.4 oz) Most Recent Weight: Weight: 3.13 kg (6 lb 14.4 oz)   Weight Change from Birth: -5%            Maternal Assessment:      Maternal Data:   Information for the patient's mother:  Julia Araujo MAGGIE [[id number]]   23 y.o.   /Para:   Information for the patient's mother:  Julia Araujo MAGGIE [[id number]]       Information for the patient's mother:  Julia Araujo MAGGIE [[id number]]   38w2d            Intervention during consultation:     Interventions Performed:   Education  and breast pump for home.    Pump Arrangements:  Breast pump requested: Zomee Z2    Pump Distributed: Yes    Education:  Discharge breastfeeding education           Action/Plan:  Breastfeed on cue and at least 8 times/24 hours, unlimited timing. May not nurse this often in the first 24 hours. Wake baby and offer breastfeeding if it has been 3 hours since the beginning of last feeding. Place infant skin to skin if infant will not breastfeed at 3 hours.   Hand express prior to latch to leodan nipple and entice infant to the breast.   It is important to use gentle stimulation during feeding to promote active eating. Offer both breasts at every feeding. Burp infant in between sides. Alternate which breast is used first.   Offer STS

## 2025-04-29 NOTE — PLAN OF CARE
Problem: Postpartum  Goal: Experiences normal postpartum course  Description:  Postpartum OB-Pregnancy care plan goal which identifies if the mother is experiencing a normal postpartum course  2025 by Alla Moe RN  Outcome: Adequate for Discharge  2025 by Neyda Henning RN  Outcome: Progressing  Goal: Appropriate maternal -  bonding  Description:  Postpartum OB-Pregnancy care plan goal which identifies if the mother and  are bonding appropriately  2025 by Alla Moe RN  Outcome: Adequate for Discharge  2025 by Neyda Henning RN  Outcome: Progressing  Goal: Establishment of infant feeding pattern  Description:  Postpartum OB-Pregnancy care plan goal which identifies if the mother is establishing a feeding pattern with their   2025 by Alla Moe RN  Outcome: Adequate for Discharge  2025 by Neyda Henning RN  Outcome: Progressing  Goal: Incisions, wounds, or drain sites healing without S/S of infection  2025 by Alla Moe RN  Outcome: Adequate for Discharge  2025 by Neyda Henning RN  Outcome: Progressing     Problem: Pain  Goal: Verbalizes/displays adequate comfort level or baseline comfort level  2025 by Alla Moe RN  Outcome: Adequate for Discharge  2025 by Neyda Henning RN  Outcome: Progressing     Problem: Infection - Adult  Goal: Absence of infection at discharge  Outcome: Adequate for Discharge  Goal: Absence of infection during hospitalization  Outcome: Adequate for Discharge  Goal: Absence of fever/infection during anticipated neutropenic period  Outcome: Adequate for Discharge     Problem: Safety - Adult  Goal: Free from fall injury  Outcome: Adequate for Discharge     Problem: Discharge Planning  Goal: Discharge to home or other facility with appropriate resources  Outcome: Adequate for Discharge     Problem: Chronic Conditions and

## (undated) DEVICE — SUTURE PERMA-HAND SZ 2-0 L30IN NONABSORBABLE BLK L26MM SH K833H

## (undated) DEVICE — STANDARD HYPODERMIC NEEDLE,POLYPROPYLENE HUB: Brand: MONOJECT

## (undated) DEVICE — ADHESIVE SKIN CLSR 0.7ML TOP DERMBND ADV

## (undated) DEVICE — CAMERA COVER: Brand: UNBRANDED

## (undated) DEVICE — YANKAUER,OPEN TIP,W/O VENT,STERILE: Brand: MEDLINE INDUSTRIES, INC.

## (undated) DEVICE — PROVE COVER: Brand: UNBRANDED

## (undated) DEVICE — BLADE ES ELASTOMERIC COAT INSUL DURABLE BEND UPTO 90DEG

## (undated) DEVICE — SUTURE VCRL + SZ 3-0 L27IN ABSRB UD L26MM SH 1/2 CIR VCP416H

## (undated) DEVICE — GLOVE,SURG,SENSICARE SLT,LF,PF,6.5: Brand: MEDLINE

## (undated) DEVICE — SUTURE MCRYL + SZ 4-0 L18IN ABSRB UD L19MM PS-2 3/8 CIR MCP496G

## (undated) DEVICE — SYRINGE MED 10ML LUERLOCK TIP W/O SFTY DISP

## (undated) DEVICE — APPLICATOR MEDICATED 26 CC SOLUTION HI LT ORNG CHLORAPREP

## (undated) DEVICE — GOWN SIRUS NONREIN LG W/TWL: Brand: MEDLINE INDUSTRIES, INC.

## (undated) DEVICE — Device

## (undated) DEVICE — CLIP LIG M BLU TI HRT SHP WIRE HORZ 600 PER BX

## (undated) DEVICE — COVER US PRB W12XL244CM SURGICAL INTRAOPERATIVE PLAS TAPR L